# Patient Record
Sex: FEMALE | Race: WHITE | NOT HISPANIC OR LATINO | Employment: STUDENT | URBAN - METROPOLITAN AREA
[De-identification: names, ages, dates, MRNs, and addresses within clinical notes are randomized per-mention and may not be internally consistent; named-entity substitution may affect disease eponyms.]

---
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
## 2020-01-15 ENCOUNTER — OFFICE VISIT (OUTPATIENT)
Dept: INTERNAL MEDICINE | Facility: CLINIC | Age: 20
End: 2020-01-15
Payer: OTHER GOVERNMENT

## 2020-01-15 VITALS
HEIGHT: 68 IN | DIASTOLIC BLOOD PRESSURE: 64 MMHG | TEMPERATURE: 97 F | BODY MASS INDEX: 44.41 KG/M2 | WEIGHT: 293 LBS | OXYGEN SATURATION: 98 % | SYSTOLIC BLOOD PRESSURE: 112 MMHG | HEART RATE: 88 BPM

## 2020-01-15 DIAGNOSIS — F98.8 ATTENTION DEFICIT DISORDER (ADD) WITHOUT HYPERACTIVITY: Chronic | ICD-10-CM

## 2020-01-15 DIAGNOSIS — E55.9 VITAMIN D DEFICIENCY: ICD-10-CM

## 2020-01-15 DIAGNOSIS — E66.1 CLASS 3 DRUG-INDUCED OBESITY WITH BODY MASS INDEX (BMI) OF 45.0 TO 49.9 IN ADULT, UNSPECIFIED WHETHER SERIOUS COMORBIDITY PRESENT: ICD-10-CM

## 2020-01-15 DIAGNOSIS — J30.1 SEASONAL ALLERGIC RHINITIS DUE TO POLLEN: Chronic | ICD-10-CM

## 2020-01-15 DIAGNOSIS — Z78.9 USES BIRTH CONTROL: Primary | ICD-10-CM

## 2020-01-15 DIAGNOSIS — F32.A DEPRESSION, UNSPECIFIED DEPRESSION TYPE: ICD-10-CM

## 2020-01-15 DIAGNOSIS — J45.20 MILD INTERMITTENT ASTHMA WITHOUT COMPLICATION: Chronic | ICD-10-CM

## 2020-01-15 PROBLEM — R03.0 ELEVATED BLOOD PRESSURE READING: Status: RESOLVED | Noted: 2019-05-27 | Resolved: 2020-01-15

## 2020-01-15 PROCEDURE — 99999 PR PBB SHADOW E&M-EST. PATIENT-LVL IV: ICD-10-PCS | Mod: PBBFAC,,, | Performed by: PHYSICIAN ASSISTANT

## 2020-01-15 PROCEDURE — 99214 OFFICE O/P EST MOD 30 MIN: CPT | Mod: S$PBB,,, | Performed by: PHYSICIAN ASSISTANT

## 2020-01-15 PROCEDURE — 99999 PR PBB SHADOW E&M-EST. PATIENT-LVL IV: CPT | Mod: PBBFAC,,, | Performed by: PHYSICIAN ASSISTANT

## 2020-01-15 PROCEDURE — 99214 PR OFFICE/OUTPT VISIT, EST, LEVL IV, 30-39 MIN: ICD-10-PCS | Mod: S$PBB,,, | Performed by: PHYSICIAN ASSISTANT

## 2020-01-15 PROCEDURE — 99214 OFFICE O/P EST MOD 30 MIN: CPT | Mod: PBBFAC | Performed by: PHYSICIAN ASSISTANT

## 2020-01-15 RX ORDER — NORETHINDRONE ACETATE AND ETHINYL ESTRADIOL .02; 1 MG/1; MG/1
1 TABLET ORAL DAILY
Qty: 90 TABLET | Refills: 4 | Status: SHIPPED | OUTPATIENT
Start: 2020-01-15 | End: 2020-02-17 | Stop reason: SDUPTHER

## 2020-01-15 RX ORDER — LISDEXAMFETAMINE DIMESYLATE 30 MG/1
30 CAPSULE ORAL EVERY MORNING
Qty: 30 CAPSULE | Refills: 0 | Status: SHIPPED | OUTPATIENT
Start: 2020-01-15 | End: 2021-04-12 | Stop reason: SDUPTHER

## 2020-01-15 NOTE — PROGRESS NOTES
Subjective:      Patient ID: Hazel Villagran is a 19 y.o. female.    Chief Complaint: Medication Refill    HPI  Here today for a refill on her vyvanse 30mg. Last refilled in May. She has not been able to follow up due to being very busy. She has been out fo the past 2 months. She reports that her ability to talk with other and complete her tasks and job duties was much improved while on the vyvanse.   NO SE with vyvanse. No palpitations or anxiety with the vyvanse.   Previous PCP was Dr. Lima in [place] but since she recently moved to [place] she would like to re-establish with someone here at [facility].   She would also like a refill on her OCPs.    Patient Active Problem List   Diagnosis    Attention deficit disorder (ADD) without hyperactivity-stable on vyvanse.     Allergic rhinitis-stable on zyrtec and singulair    Mild intermittent asthma without complication-stable on singulair and rescue inhaler as needed    Vitamin D deficiency-has not been on OTC supplement    Class 3 severe obesity with body mass index (BMI) of 45.0 to 49.9 in adult    Depression-stable on trazodone.        Review of Systems   Constitutional: Negative for activity change, appetite change, chills, diaphoresis, fatigue, fever and unexpected weight change.   HENT: Negative.  Negative for congestion, hearing loss, postnasal drip, rhinorrhea, sore throat, trouble swallowing and voice change.    Eyes: Negative.  Negative for visual disturbance.   Respiratory: Negative.  Negative for cough, choking, chest tightness and shortness of breath.    Cardiovascular: Negative for chest pain, palpitations and leg swelling.   Gastrointestinal: Negative for abdominal distention, abdominal pain, blood in stool, constipation, diarrhea, nausea and vomiting.   Endocrine: Negative for cold intolerance, heat intolerance, polydipsia and polyuria.   Genitourinary: Negative.  Negative for difficulty urinating and frequency.   Musculoskeletal: Negative  "for arthralgias, back pain, gait problem, joint swelling and myalgias.   Skin: Negative for color change, pallor, rash and wound.   Neurological: Negative for dizziness, tremors, weakness, light-headedness, numbness and headaches.   Hematological: Negative for adenopathy.   Psychiatric/Behavioral: Positive for decreased concentration. Negative for agitation, behavioral problems, confusion, dysphoric mood, hallucinations, self-injury, sleep disturbance and suicidal ideas. The patient is not nervous/anxious and is not hyperactive.      Objective:   /64 (BP Location: Right arm, Patient Position: Sitting, BP Method: Large (Manual))   Pulse 88   Temp 97.3 °F (36.3 °C) (Tympanic)   Ht 5' 8" (1.727 m)   Wt (!) 156.2 kg (344 lb 5.7 oz)   SpO2 98%   BMI 52.36 kg/m²     Physical Exam   Constitutional: She is oriented to person, place, and time. She appears well-developed and well-nourished. No distress.   HENT:   Head: Normocephalic and atraumatic.   Right Ear: External ear normal.   Left Ear: External ear normal.   Nose: Nose normal.   Mouth/Throat: Oropharynx is clear and moist.   Eyes: Pupils are equal, round, and reactive to light. Conjunctivae and EOM are normal.   Neck: Normal range of motion. Neck supple.   Cardiovascular: Normal rate, regular rhythm and normal heart sounds. Exam reveals no gallop and no friction rub.   No murmur heard.  Pulmonary/Chest: Effort normal and breath sounds normal. No respiratory distress. She has no wheezes. She has no rales. She exhibits no tenderness.   Abdominal: Soft. She exhibits no distension. There is no tenderness.   Musculoskeletal: Normal range of motion.   Lymphadenopathy:     She has no cervical adenopathy.   Neurological: She is alert and oriented to person, place, and time.   Skin: Skin is warm and dry. She is not diaphoretic.   Psychiatric: She has a normal mood and affect. Her behavior is normal. Judgment and thought content normal.   Vitals " reviewed.      Assessment:     1. Uses birth control    2. Attention deficit disorder (ADD) without hyperactivity    3. Class 3 drug-induced obesity with body mass index (BMI) of 45.0 to 49.9 in adult, unspecified whether serious comorbidity present    4. Seasonal allergic rhinitis due to pollen    5. Depression, unspecified depression type    6. Mild intermittent asthma without complication    7. Vitamin D deficiency      Plan:   Uses birth control  -     norethindrone-ethinyl estradiol (MICROGESTIN 1/20) 1-20 mg-mcg per tablet; Take 1 tablet by mouth once daily.  Dispense: 90 tablet; Refill: 4    Attention deficit disorder (ADD) without hyperactivity  -     VYVANSE 30 mg capsule; Take 1 capsule (30 mg total) by mouth every morning.  Dispense: 30 capsule; Refill: 0  -last filled in may,  checked    Class 3 drug-induced obesity with body mass index (BMI) of 45.0 to 49.9 in adult, unspecified whether serious comorbidity present  Advise to maintain lifestyle changes with low carbohydrate, low sugar diet and exercise 30 minutes daily    Seasonal allergic rhinitis due to pollen  -stable on zyrtec and singulair    Depression, unspecified depression type  -continue trazodone.     Mild intermittent asthma without complication  -Stable and controlled. Continue current treatment plan as previously prescribed with your PCP.     Vitamin D deficiency  -start OTC vitamin D3 at least 800iu daily    Follow up in about 4 weeks (around 2/12/2020), or if symptoms worsen or fail to improve.

## 2020-02-17 ENCOUNTER — OFFICE VISIT (OUTPATIENT)
Dept: INTERNAL MEDICINE | Facility: CLINIC | Age: 20
End: 2020-02-17
Payer: OTHER GOVERNMENT

## 2020-02-17 ENCOUNTER — OFFICE VISIT (OUTPATIENT)
Dept: OBSTETRICS AND GYNECOLOGY | Facility: CLINIC | Age: 20
End: 2020-02-17
Payer: OTHER GOVERNMENT

## 2020-02-17 VITALS
WEIGHT: 293 LBS | HEIGHT: 68 IN | BODY MASS INDEX: 44.41 KG/M2 | DIASTOLIC BLOOD PRESSURE: 86 MMHG | SYSTOLIC BLOOD PRESSURE: 130 MMHG

## 2020-02-17 VITALS
BODY MASS INDEX: 44.41 KG/M2 | WEIGHT: 293 LBS | HEIGHT: 68 IN | DIASTOLIC BLOOD PRESSURE: 72 MMHG | TEMPERATURE: 98 F | HEART RATE: 80 BPM | OXYGEN SATURATION: 98 % | SYSTOLIC BLOOD PRESSURE: 112 MMHG

## 2020-02-17 DIAGNOSIS — G43.909 MIGRAINE WITHOUT STATUS MIGRAINOSUS, NOT INTRACTABLE, UNSPECIFIED MIGRAINE TYPE: ICD-10-CM

## 2020-02-17 DIAGNOSIS — F98.8 ATTENTION DEFICIT DISORDER (ADD) WITHOUT HYPERACTIVITY: Chronic | ICD-10-CM

## 2020-02-17 DIAGNOSIS — E66.01 SEVERE OBESITY (BMI >= 40): ICD-10-CM

## 2020-02-17 DIAGNOSIS — F32.A DEPRESSION, UNSPECIFIED DEPRESSION TYPE: ICD-10-CM

## 2020-02-17 DIAGNOSIS — Z78.9 USES BIRTH CONTROL: ICD-10-CM

## 2020-02-17 DIAGNOSIS — Z30.41 ENCOUNTER FOR SURVEILLANCE OF CONTRACEPTIVE PILLS: Primary | ICD-10-CM

## 2020-02-17 DIAGNOSIS — Z00.00 ROUTINE GENERAL MEDICAL EXAMINATION AT A HEALTH CARE FACILITY: Primary | ICD-10-CM

## 2020-02-17 DIAGNOSIS — Z00.00 ROUTINE GENERAL MEDICAL EXAMINATION AT A HEALTH CARE FACILITY: ICD-10-CM

## 2020-02-17 DIAGNOSIS — J45.20 MILD INTERMITTENT ASTHMA WITHOUT COMPLICATION: Chronic | ICD-10-CM

## 2020-02-17 PROCEDURE — 99213 OFFICE O/P EST LOW 20 MIN: CPT | Mod: PBBFAC | Performed by: NURSE PRACTITIONER

## 2020-02-17 PROCEDURE — 99999 PR PBB SHADOW E&M-EST. PATIENT-LVL III: CPT | Mod: PBBFAC,,, | Performed by: NURSE PRACTITIONER

## 2020-02-17 PROCEDURE — 99999 PR PBB SHADOW E&M-EST. PATIENT-LVL V: ICD-10-PCS | Mod: PBBFAC,,, | Performed by: INTERNAL MEDICINE

## 2020-02-17 PROCEDURE — 99215 OFFICE O/P EST HI 40 MIN: CPT | Mod: PBBFAC,27 | Performed by: INTERNAL MEDICINE

## 2020-02-17 PROCEDURE — 99213 OFFICE O/P EST LOW 20 MIN: CPT | Mod: S$PBB,,, | Performed by: NURSE PRACTITIONER

## 2020-02-17 PROCEDURE — 99999 PR PBB SHADOW E&M-EST. PATIENT-LVL V: CPT | Mod: PBBFAC,,, | Performed by: INTERNAL MEDICINE

## 2020-02-17 PROCEDURE — 99999 PR PBB SHADOW E&M-EST. PATIENT-LVL III: ICD-10-PCS | Mod: PBBFAC,,, | Performed by: NURSE PRACTITIONER

## 2020-02-17 PROCEDURE — 99213 PR OFFICE/OUTPT VISIT, EST, LEVL III, 20-29 MIN: ICD-10-PCS | Mod: S$PBB,,, | Performed by: NURSE PRACTITIONER

## 2020-02-17 PROCEDURE — 99395 PR PREVENTIVE VISIT,EST,18-39: ICD-10-PCS | Mod: S$PBB,,, | Performed by: INTERNAL MEDICINE

## 2020-02-17 PROCEDURE — 99395 PREV VISIT EST AGE 18-39: CPT | Mod: S$PBB,,, | Performed by: INTERNAL MEDICINE

## 2020-02-17 RX ORDER — NORETHINDRONE ACETATE AND ETHINYL ESTRADIOL .02; 1 MG/1; MG/1
1 TABLET ORAL DAILY
Qty: 90 TABLET | Refills: 4 | Status: SHIPPED | OUTPATIENT
Start: 2020-02-17 | End: 2021-04-12

## 2020-02-17 RX ORDER — BUPROPION HYDROCHLORIDE 150 MG/1
150 TABLET ORAL DAILY
Qty: 90 TABLET | Refills: 3 | Status: SHIPPED | OUTPATIENT
Start: 2020-02-17 | End: 2021-04-12

## 2020-02-17 NOTE — PROGRESS NOTES
Subjective:      Patient ID: Hazel Villagran is a 19 y.o. female.    Chief Complaint: Establish Care    Depression   Visit Type: initial  Onset of symptoms: more than 1 year ago  Progression since onset: gradually worsening  Patient presents with the following symptoms: anhedonia, depressed mood, feelings of hopelessness, feelings of worthlessness and weight gain.  Patient is not experiencing: chest pain, compulsions, confusion, irritability, memory impairment, nervousness/anxiety, palpitations, suicidal ideas, suicidal planning and weight loss.  Frequency of symptoms: most days   Severity: moderate   Exacerbated by: living , financial situations.  Sleep quality: fair (trazodone helps)  Risk factors: major life event  Patient has a history of: depression  No history of: CAD  Treatments tried: trazodone.  Compliance with treatment: good  Improvement on treatment: mild  Compliance problems: none.       Review of Systems   Constitutional: Positive for weight gain. Negative for activity change, chills, diaphoresis, fever, irritability, unexpected weight change and weight loss.   HENT: Negative for ear pain, hearing loss, rhinorrhea, sore throat and trouble swallowing.    Eyes: Negative for discharge and visual disturbance.   Respiratory: Negative for cough, chest tightness and wheezing.    Cardiovascular: Negative for chest pain and palpitations.   Gastrointestinal: Negative for abdominal pain, blood in stool, constipation, diarrhea and vomiting.   Endocrine: Negative for polydipsia and polyuria.   Genitourinary: Negative for difficulty urinating, dysuria, hematuria and menstrual problem.   Musculoskeletal: Positive for arthralgias and neck pain. Negative for joint swelling.   Neurological: Positive for headaches. Negative for seizures, syncope and weakness.   Psychiatric/Behavioral: Positive for depression and dysphoric mood. Negative for confusion, hallucinations and suicidal ideas. The patient is not nervous/anxious.       18 yo with   Patient Active Problem List   Diagnosis    Attention deficit disorder (ADD) without hyperactivity    Allergic rhinitis    Mild intermittent asthma without complication    Vitamin D deficiency    Class 3 severe obesity with body mass index (BMI) of 45.0 to 49.9 in adult    Depression    Migraine     Past Medical History:   Diagnosis Date    Asthma     Attention deficit disorder (ADD) without hyperactivity 1/2/2018    Class 3 severe obesity with body mass index (BMI) of 45.0 to 49.9 in adult     Depression 2018    Her psychiatrist feels it started earlier than 2018; also w/ FHx of depression; no s/h/i currently    Insulin resistance 1/2/2018    Migraine headache     Dx'd by Dr. Nogueira in neurology    Mild intermittent asthma without complication 1/2/2018    Multiple allergies     Vitamin D deficiency 01/22/2019     Here today for annual prev exam.  Compliant with meds without significant side effects. Energy and appetite are good.     Pt also c/o depression. New problem to me.     family history includes Cancer in her maternal grandfather and maternal grandmother; Diabetes in her father and paternal grandfather; Migraines in her mother.    Social History     Socioeconomic History    Marital status: Single     Spouse name: Not on file    Number of children: Not on file    Years of education: Not on file    Highest education level: Not on file   Occupational History    Not on file   Social Needs    Financial resource strain: Somewhat hard    Food insecurity:     Worry: Sometimes true     Inability: Sometimes true    Transportation needs:     Medical: No     Non-medical: No   Tobacco Use    Smoking status: Never Smoker    Smokeless tobacco: Never Used   Substance and Sexual Activity    Alcohol use: No     Frequency: Monthly or less     Drinks per session: 1 or 2     Binge frequency: Less than monthly    Drug use: No    Sexual activity: Not Currently     Birth  control/protection: Pill   Lifestyle    Physical activity:     Days per week: 2 days     Minutes per session: 30 min    Stress: To some extent   Relationships    Social connections:     Talks on phone: More than three times a week     Gets together: Once a week     Attends Worship service: Not on file     Active member of club or organization: Yes     Attends meetings of clubs or organizations: More than 4 times per year     Relationship status: Never    Other Topics Concern    Not on file   Social History Narrative    Patient goal(s):    Motivation for goals (0-10):    Breakfast (quantity, including drinks):    Lunch:     Dinner:     Snacks:    Eating out:     Water (oz/day): 60 oz/d (previously 30 oz/d)    Physical Activity (min/d & d/wk):      Past Surgical History:   Procedure Laterality Date    TONSILLECTOMY, ADENOIDECTOMY         Review of Systems   Constitutional: Positive for weight gain. Negative for activity change, chills, diaphoresis, fever, irritability, unexpected weight change and weight loss.   HENT: Negative for ear pain, hearing loss, rhinorrhea, sore throat and trouble swallowing.    Eyes: Negative for discharge and visual disturbance.   Respiratory: Negative for cough, chest tightness and wheezing.    Cardiovascular: Negative for chest pain and palpitations.   Gastrointestinal: Negative for abdominal pain, blood in stool, constipation, diarrhea and vomiting.   Endocrine: Negative for polydipsia and polyuria.   Genitourinary: Negative for difficulty urinating, dysuria, hematuria and menstrual problem.   Musculoskeletal: Positive for arthralgias and neck pain. Negative for joint swelling.   Neurological: Positive for headaches. Negative for seizures, syncope and weakness.   Psychiatric/Behavioral: Positive for depression and dysphoric mood. Negative for confusion, hallucinations and suicidal ideas. The patient is not nervous/anxious.      Objective:   /72 (BP Location: Left arm)  "  Pulse 80   Temp 98.3 °F (36.8 °C) (Tympanic)   Ht 5' 8" (1.727 m)   Wt (!) 156.1 kg (344 lb 2.2 oz)   LMP 02/07/2020 (Approximate)   SpO2 98%   BMI 52.33 kg/m²     Physical Exam   Constitutional: She is oriented to person, place, and time. She appears well-developed and well-nourished. No distress.   HENT:   Head: Normocephalic and atraumatic.   Mouth/Throat: Oropharynx is clear and moist.   Eyes: Pupils are equal, round, and reactive to light. EOM are normal.   Neck: Neck supple. No thyromegaly present.   Cardiovascular: Normal rate and regular rhythm.   Pulmonary/Chest: Breath sounds normal. She has no wheezes. She has no rales.   Abdominal: Soft. Bowel sounds are normal. There is no tenderness.   Musculoskeletal: She exhibits no edema.   Lymphadenopathy:     She has no cervical adenopathy.   Neurological: She is alert and oriented to person, place, and time.   Skin: Skin is warm and dry.   Psychiatric: She has a normal mood and affect. Her behavior is normal.       Assessment:     1. Routine general medical examination at a health care facility    2. Attention deficit disorder (ADD) without hyperactivity    3. Depression, unspecified depression type    4. Mild intermittent asthma without complication    5. Migraine without status migrainosus, not intractable, unspecified migraine type    6. Severe obesity (BMI >= 40)      Plan:   Routine general medical examination at a health care facility  Heart healthy diet and regular exercise.  Health maintenance reviewed patient  Advised flu vaccine  -     Comprehensive metabolic panel; Future; Expected date: 02/17/2020  -     CBC auto differential; Future; Expected date: 02/17/2020  -     TSH; Future; Expected date: 02/17/2020  -     Lipid panel; Future; Expected date: 02/17/2020  -     Vitamin D; Future; Expected date: 02/17/2020  -     Hemoglobin A1c; Future; Expected date: 02/17/2020  -     HIV 1/2 Ag/Ab (4th Gen); Future; Expected date: 02/17/2020  -     " Ambulatory referral/consult to Gynecology; Future; Expected date: 02/24/2020    Attention deficit disorder (ADD) without hyperactivity  Reports does not need Vyvanse at this time.  Takes on her work days.      Depression, unspecified depression type  -     Ambulatory referral/consult to Psychiatry; Future; Expected date: 02/24/2020  -     buPROPion (WELLBUTRIN XL) 150 MG TB24 tablet; Take 1 tablet (150 mg total) by mouth once daily.  Dispense: 90 tablet; Refill: 3    Mild intermittent asthma without complication  No recent flare    Migraine without status migrainosus, not intractable, unspecified migraine type  No recent headaches    Severe obesity (BMI >= 40)  Diet and exercise as discussed  -     Ambulatory referral/consult to Nutrition Services; Future; Expected date: 02/24/2020        Lab Frequency Next Occurrence   Ambulatory referral/consult to Psychiatry Once 02/24/2020   Comprehensive metabolic panel Once 02/17/2020   CBC auto differential Once 02/17/2020   TSH Once 02/17/2020   Lipid panel Once 02/17/2020   Vitamin D Once 02/17/2020   Hemoglobin A1c Once 02/17/2020   HIV 1/2 Ag/Ab (4th Gen) Once 02/17/2020   Ambulatory referral/consult to Gynecology Once 02/24/2020   Ambulatory referral/consult to Nutrition Services Once 02/24/2020       Problem List Items Addressed This Visit        Neuro    Migraine       Psychiatric    Depression    Overview     Her psychiatrist feels it started earlier than 2018; also w/ FHx of depression; no s/h/i currently         Relevant Medications    buPROPion (WELLBUTRIN XL) 150 MG TB24 tablet    Other Relevant Orders    Ambulatory referral/consult to Psychiatry    Attention deficit disorder (ADD) without hyperactivity (Chronic)       Pulmonary    Mild intermittent asthma without complication (Chronic)      Other Visit Diagnoses     Routine general medical examination at a health care facility    -  Primary    Relevant Orders    Comprehensive metabolic panel    CBC auto  differential    TSH    Lipid panel    Vitamin D    Hemoglobin A1c    HIV 1/2 Ag/Ab (4th Gen)    Ambulatory referral/consult to Gynecology    Severe obesity (BMI >= 40)        Relevant Orders    Ambulatory referral/consult to Nutrition Services          Follow up in about 4 weeks (around 3/16/2020), or if symptoms worsen or fail to improve.

## 2020-02-17 NOTE — PROGRESS NOTES
Subjective:      Hazel Villagran is a 19 y.o. female who presents for contraception counseling. The patient has no complaints today. The patient is not currently sexually active. Pertinent past medical history: none.    Menstrual History:  OB History        0    Para   0    Term   0       0    AB   0    Living   0       SAB   0    TAB   0    Ectopic   0    Multiple   0    Live Births   0                  Patient's last menstrual period was 2020 (approximate).       The following portions of the patient's history were reviewed and updated as appropriate: allergies, current medications, past family history, past medical history, past social history, past surgical history and problem list.    Review of Systems  Pertinent items are noted in HPI.     Objective:       deferred      Assessment:      19 y.o., continuing OCP (estrogen/progesterone), no contraindications.     Plan:      All questions answered.

## 2020-02-17 NOTE — PATIENT INSTRUCTIONS
Birth Control: The Pill    Birth control pills contain hormones that help prevent pregnancy. The pills are prescribed by your healthcare provider. There are many types of birth control pills available. If you have side effects from one type of pill, tell your healthcare provider. He or she may be able to prescribe a pill that works better for you.  Pregnancy rates  Talk to your healthcare provider about the effectiveness of this birth control method.  Using the pill  · Take one pill daily. Take it at around the same time each day.  · Follow your healthcare providers guidelines on when to start your first pack of pills. You may need to use another form of birth control for a week or more after you start.  · Know what to do if you forget to take a pill. (Consult your healthcare provider or check the package.) If you miss more than one pill, you may need to use a backup method of birth control for a week or more.  Pros  · Low pregnancy rate  · No interruption to sex  · Easy to use  · Can help make periods more regular  · May lower your risk of ovarian cysts and certain cancers  · May decrease menstrual cramps, menstrual flow, and acne  Cons  · Does not protect against sexually transmitted infection (STIs)  · Requires taking a pill on time each day  · May not work as well when taken with certain other medicines (check with your pharmacist)  · May cause side effects such as nausea, irregular bleeding, headaches, breast tenderness, fatigue, or mood changes (these often go away within 3 months)  · May increase the risk of blood clots, heart attack, and stroke  The pill may not be for you  The pill may not be for you if:  · You are a smoker and over age 35  · You have high blood pressure or gallbladder, liver, cerebrovascular  or heart disease  · You have diabetes, migraines, blood clot in the vein or artery, lupus, depression, certain lipid disorders, or take medicines that interfere with the pill  In these cases,  discuss the risks with your healthcare provider.  Date Last Reviewed: 3/1/2017  © 8566-4840 [employer]. [address] [place] [address]. All rights reserved. This information is not intended as a substitute for professional medical care. Always follow your healthcare professional's instructions.        How Birth Control Works  Birth control prevents pregnancy by preventing conception. Some methods prevent an egg from maturing. Some keep the sperm and egg from meeting. And some methods work in both ways.  Preventing ovulation  Certain hormones help prevent an egg from maturing and being released. Hormone methods include:  · Birth control pills  · Skin patches  · Contraceptive vaginal rings  · Injections  Preventing sperm and egg from meeting  Methods that prevent the sperm and egg from joining include:  · Barrier methods, such as the condom, the diaphragm, and the cervical cap  · Spermicide  · The IUD (intrauterine device)  · Sterilization  · Natural family planning  · Some types of hormone methods  Date Last Reviewed: 3/1/2017  © 8460-1884 [employer]. [address] [place] [address]. All rights reserved. This information is not intended as a substitute for professional medical care. Always follow your healthcare professional's instructions.

## 2020-03-05 ENCOUNTER — PATIENT OUTREACH (OUTPATIENT)
Dept: ADMINISTRATIVE | Facility: OTHER | Age: 20
End: 2020-03-05

## 2020-03-20 ENCOUNTER — TELEPHONE (OUTPATIENT)
Dept: ADMINISTRATIVE | Facility: HOSPITAL | Age: 20
End: 2020-03-20

## 2020-03-20 NOTE — TELEPHONE ENCOUNTER
Contacted patient to reschedule appointment with Dr Quiñones on 03/23/2020. Left voicemail for patient to call to reschedule appointment. PDaugherty

## 2020-03-23 ENCOUNTER — PATIENT MESSAGE (OUTPATIENT)
Dept: NUTRITION | Facility: CLINIC | Age: 20
End: 2020-03-23

## 2020-03-23 ENCOUNTER — TELEPHONE (OUTPATIENT)
Dept: NUTRITION | Facility: CLINIC | Age: 20
End: 2020-03-23

## 2020-03-29 ENCOUNTER — PATIENT OUTREACH (OUTPATIENT)
Dept: ADMINISTRATIVE | Facility: OTHER | Age: 20
End: 2020-03-29

## 2020-04-30 ENCOUNTER — PATIENT OUTREACH (OUTPATIENT)
Dept: ADMINISTRATIVE | Facility: OTHER | Age: 20
End: 2020-04-30

## 2020-05-01 ENCOUNTER — TELEPHONE (OUTPATIENT)
Dept: NUTRITION | Facility: CLINIC | Age: 20
End: 2020-05-01

## 2020-05-01 NOTE — TELEPHONE ENCOUNTER
Called patient to remind her of appt at 1PM. Pt was driving. Rescheduled visit for 5/4/2020 at 1 PM.

## 2020-05-01 NOTE — PROGRESS NOTES
" The patient location is: [place]  The chief complaint leading to consultation is: Weight management   Visit type: audiovisual  Total time spent with patient: 68 minutes  Each patient to whom he or she provides medical services by telemedicine is:  (1) informed of the relationship between the physician and patient and the respective role of any other health care provider with respect to management of the patient; and (2) notified that he or she may decline to receive medical services by telemedicine and may withdraw from such care at any time.    Notes:   Nutrition Assessment  Client name:  Hazel Villagran  :  2000  Age:  19 y.o.  Gender:  female    Client states:  Previously Struggled With Eating Disorder Binge/Purge Pattern with days of not eating, she never received treatment for her ED. Reports it is under control and she is in a better mental place. Pt used be on metformin but it made her sick. She is aware the carbs turn into sugar. On vivance which help regulates ADD and binge eating. Pt reports that she does not have a scale and prefers not to weigh daily. Dairy causes pt to break out and she has occasional lactose intolerance. Pt believes that eating red meat causes a lot of inflammation. Pt reports she often forgets to eat/ medicine causes her not to be hungry. Reports inability to afford healthy food. Pt enjoys cooking and sticks to same types of food that she meal preps. Overall diet is healthy but pt reports inability to lose weight. Pt plans to sign up for food stamps next week and will receive financial assistance when she is living at home with mom in [place] for summer. Shops primarily at [employer] and has approximately $100-150 budgeted for food when she is working.    Anthropometrics  Height:  5' 8"    Weight:  343 lbs  BMI:  52.3 kg/m2      Clinical Signs/Symptoms  N/V/D:  Occasionally experiences nausea/vomiting  Appetite (Good, Fair, or Poor): Fair     Past Medical History: "   Diagnosis Date    Asthma     Attention deficit disorder (ADD) without hyperactivity 1/2/2018    Class 3 severe obesity with body mass index (BMI) of 45.0 to 49.9 in adult     Depression 2018    Her psychiatrist feels it started earlier than 2018; also w/ FHx of depression; no s/h/i currently    Insulin resistance 1/2/2018    Migraine headache     Dx'd by Dr. Nogueira in neurology    Mild intermittent asthma without complication 1/2/2018    Multiple allergies     Vitamin D deficiency 01/22/2019       Past Surgical History:   Procedure Laterality Date    TONSILLECTOMY, ADENOIDECTOMY         Medications    has a current medication list which includes the following prescription(s): albuterol, bupropion, cetirizine, epinephrine, metronidazole 1%, montelukast, norethindrone-ethinyl estradiol, trazodone, vyvanse, and zonisamide.    Vitamins, Minerals, and/or Supplements:  Vitamin D, B12 when she could afford to, and Digestion Plus from [facility]    Food/Medication Interactions:  Reviewed     Food Allergies or Intolerances: Trying to be dairy free, limiting red meat intake, Allergic to Kiwi and honey Dew melons and Cantalope.    Social History    Marital status:  Single  Employment:  Was working at [employer] during quarantine and got a good amount of hours, plans to move to [place] on May 15th for the summer. Was in school for cosmetology but graduated. Plans to move back to [place] once roomates come back for fall semester.     Social History     Tobacco Use    Smoking status: Never Smoker    Smokeless tobacco: Never Used   Substance Use Topics    Alcohol use: No     Frequency: Monthly or less     Drinks per session: 1 or 2     Binge frequency: Less than monthly        Lab Reports   No recent labs    Food History  Yesterday/Today:    Lunch:  Popcorn[employer] Bagged Popcorn  Dinner:  [place] w/ Sliced Chicken Breast, Lettuce, Cheese, Mayonnaise on wheat bread  H.S. Snack:  Sometimes she'll have a  snack around dinner time    *Fluid intake:  Tries to drink 1-2 Liters of water per day, occasional sweet tea and lemonade/carolyn flavoring      Reports that she doesn't eat 3 formal meals a day but she'll have 2 meals and 2 snacks on a normal day when she remembers to eat. Eats regularly when she has a consistent schedule. Normal breakfast is Eggs and Avocado.     Exercise History:  Pt used to exercise but states that she no longer works out regularly since she start [employer] school. Reports she hasn't made time to work out in a while but started a work out plan with friend yesterday. [facility]    Cultural/Spiritual/Personal Preferences:  None    Support System:  None, pt lives on own/ with college student who eat pretty unhealthy. She reports cooking for herself and roomates which holds her accountable.     State of Change:  Contemplation     Barriers to Change:  Financial Difficulty to purchase foods    Diagnosis    Obesity related to lifelong disordered eating patterns as evidenced by BMI of 52.3.  Inadequate fluid intake related to inability to lose weight as evidenced by 24 hour recall reports intake less than needs.  Limited access to food related to financial constraints as evidenced by pt reporting she is unable to buy healthy foods.  Food and Nutrition related knowledge deficit related to healthy eating patterns as evidenced by no prior nutrition education.     Intervention    Calorie Needs (via [place] [address] Jeor):  Activity Factor:  1.2   - Maintenance: 0055-1464 kcal   -Loss:2200 kcal for loss of 1-2 lbs per week  Protein (via 0.8 g/kg): 56-66g   Fluid (1kcal/ml):  2700 or 90 oz      Goals:  1. Set an alarm to remind pt of meal times  2.  Exercise regularly.  3.  Sign up for EBT card.     Nutrition Education  Pt and I discussed in detail the history of her eating disorder and her weight. Educated her about the dangers of restrive eating patterns such as veganism in people who have battled  ED in the past. Discussed weight loss strategies including setting an alarm to remind patient to eat, drink plenty of fluids before meal times, eating small, frequent meals, cooking at home. Also discussed ways to cut costs when trying to eat healthy including buying frozen vegetables and using [employer] for food subscription (accepts EBT). Discussed the importance of various work out routines. Explained the MyPlate method and the importance of each macronutrient (Carb, Fat, Protein). Pt will be in [place] for the summer so follow up cannot be scheduled until she returns. Pt said she will reach out to schedule. Also educated on common diet misconceptions commonly seen on social media.     Patient verbalized understanding of nutrition education and recommendations received.    Handouts Provided  Weight Loss Tips NCM    Monitoring/Evaluation    Monitor the following:  Weight  BMI  Caloric intake    Follow Up Plan:  PRN, when patient returns from being in [place] for the summer

## 2020-05-04 ENCOUNTER — CLINICAL SUPPORT (OUTPATIENT)
Dept: NUTRITION | Facility: CLINIC | Age: 20
End: 2020-05-04
Payer: OTHER GOVERNMENT

## 2020-05-04 DIAGNOSIS — Z71.3 DIETARY COUNSELING: Primary | ICD-10-CM

## 2020-05-04 DIAGNOSIS — E66.01 SEVERE OBESITY (BMI >= 40): ICD-10-CM

## 2020-05-04 PROCEDURE — 97802 PR MED NUTR THER, 1ST, INDIV, EA 15 MIN: ICD-10-PCS | Mod: 95,,, | Performed by: DIETITIAN, REGISTERED

## 2020-05-04 PROCEDURE — 97802 MEDICAL NUTRITION INDIV IN: CPT | Mod: 95,,, | Performed by: DIETITIAN, REGISTERED

## 2020-05-04 NOTE — PATIENT INSTRUCTIONS
Food Box service that accepts EBT: [employer] [place]    Weight Loss Tips  General Tips  Eat at least three times per day.  Pay attention to your body. When you feel like you have had enough to eat, stop. Quit before you feel full, stuffed, or  sick from eating. You can have more if you are really hungry.  If you still feel hungry or unsatisfied after a meal or snack, wait at least 10 minutes before you have more food. Often,  the craving will go away.  Drink plenty of calorie-free drinks (water, tea, coffee, diet soda). You may be thirsty, not hungry.  Pick lean meats, low-fat or nonfat cheese, and fat-free (skim) or low-fat (1%) milk instead of higher-fat/higher-calorie  choices.  Get plenty of fiber. Vegetables, fruits, and whole grains are good sources. Have a high-fiber cereal every day.  Cut back on sugar. For example, drink less fruit juice and regular soda.  Limit the amount of alcohol (beer, wine, and liquor) that you drink.  Keep all food in the kitchen. Eat only in a chosen place, such as at the table. Dont eat in the car or the bedroom or in  front of the TV.  Food Preparation  Plan meals ahead of time.  Try cooking methods that cut calories:  Cook without adding fat (bake, broil, roast, boil).  Use nonstick cooking sprays instead of butter or oil. You can also use wine, broth, or fruit juice instead of oil  when cooking.  Use low-calorie foods instead of high-calorie ones when possible.  Cook only what you need for one meal (dont make leftovers).  If you do make extra portions, put them away as soon as they are ready so you can save them for other meals. Store  the leftovers in containers that you cant see through.  Cook when you are not hungry. For example, cook and refrigerate tomorrows dinner after you have finished eating  tonight.  Make fruits, vegetables, and other low-calorie foods part of each meal.  Drink water while you cook.  Mealtimes  Drink a glass of water before you eat. Drink more  during meals.  Use smaller plates, bowls, glasses, and serving spoons.  Divide your plate into four equal parts. Use one part for meat, one for starch (such as pasta, rice, potatoes, or bread),  and two for nonstarchy vegetables.  Do not put serving dishes on the table. This will make it harder to take a second portion.  Put salad dressing on the side instead of mixing it with or pouring onto your salad. Then dip your fork into the  dressing before you spear a bite of salad.  Change your usual place at the table.  Make mealtime special by using pretty dishes, napkins, and glasses.  9/3/2019  Copyright Academy of Nutrition and Dietetics. This handout may be duplicated for client education. Page 2/2  Eat slowly. Take a few 1-minute breaks from eating during meals. Put your fork down between bites. Cut your food one  bite at a time.  Enjoy fruit for dessert instead of cake, pie, or other sweets.  Leave a little food on your plate. (You control the food; it doesnt control you.)  Remove your plate as soon as youve finished eating.  Snacking  Snacking can be part of your plan for healthy weight loss. You can eat six times per day as long as you plan what to eat and  dont eat too many calories.  Plan ahead. Be sure to have healthy snacks on hand. If the right food is not there, you may be more likely to eat  whatever is available, such as candy, cookies, chips, leftovers, or other quick choices.  Keep low-calorie snacks in a special part of the refrigerator. Good choices include the following:  Reduced-fat string cheese, low-calorie yogurt, and fat-free milk.  Washed, bite-size pieces of raw vegetables, such as carrots, celery, pepper strips, cucumbers, broccoli, and  cauliflower. Serve with low-calorie dips.  Fresh fruit.  Eating and Emotions  Do you use eating to deal with feelings other than hunger, such as boredom, being tired, or stress? If you eat for these  reasons, here are some other things you can try:  Call  a friend for support.  Use inspirational quotes to help you avoid the temptation to eat.  Take a warm bath or shower.  Listen to music or a relaxation CD.  Take a walk.  Try activities that keep you from eating. For example, its hard to eat while youre exercising. If you are gardening, you  probably wont eat while your hands are covered in soil.

## 2020-11-17 ENCOUNTER — OFFICE VISIT (OUTPATIENT)
Dept: URGENT CARE | Facility: CLINIC | Age: 20
End: 2020-11-17
Payer: OTHER GOVERNMENT

## 2020-11-17 VITALS
HEART RATE: 97 BPM | TEMPERATURE: 98 F | BODY MASS INDEX: 53.87 KG/M2 | SYSTOLIC BLOOD PRESSURE: 170 MMHG | OXYGEN SATURATION: 100 % | DIASTOLIC BLOOD PRESSURE: 88 MMHG | HEIGHT: 67 IN

## 2020-11-17 DIAGNOSIS — J06.9 UPPER RESPIRATORY TRACT INFECTION, UNSPECIFIED TYPE: Primary | ICD-10-CM

## 2020-11-17 DIAGNOSIS — Z03.818 ENCOUNTER FOR OBSERVATION FOR SUSPECTED EXPOSURE TO OTHER BIOLOGICAL AGENTS RULED OUT: ICD-10-CM

## 2020-11-17 LAB
CTP QC/QA: YES
CTP QC/QA: YES
FLUAV AG NPH QL: NEGATIVE
FLUBV AG NPH QL: NEGATIVE
SARS-COV-2 RDRP RESP QL NAA+PROBE: NEGATIVE

## 2020-11-17 PROCEDURE — U0002: ICD-10-PCS | Mod: QW,S$GLB,, | Performed by: EMERGENCY MEDICINE

## 2020-11-17 PROCEDURE — 87804 POCT INFLUENZA A/B: ICD-10-PCS | Mod: QW,S$GLB,, | Performed by: EMERGENCY MEDICINE

## 2020-11-17 PROCEDURE — U0002 COVID-19 LAB TEST NON-CDC: HCPCS | Mod: QW,S$GLB,, | Performed by: EMERGENCY MEDICINE

## 2020-11-17 PROCEDURE — 99213 PR OFFICE/OUTPT VISIT, EST, LEVL III, 20-29 MIN: ICD-10-PCS | Mod: 25,S$GLB,, | Performed by: EMERGENCY MEDICINE

## 2020-11-17 PROCEDURE — 99213 OFFICE O/P EST LOW 20 MIN: CPT | Mod: 25,S$GLB,, | Performed by: EMERGENCY MEDICINE

## 2020-11-17 PROCEDURE — 87804 INFLUENZA ASSAY W/OPTIC: CPT | Mod: QW,S$GLB,, | Performed by: EMERGENCY MEDICINE

## 2020-11-17 RX ORDER — FLUTICASONE PROPIONATE 50 MCG
2 SPRAY, SUSPENSION (ML) NASAL DAILY
Qty: 16 G | Refills: 0 | Status: SHIPPED | OUTPATIENT
Start: 2020-11-17

## 2020-11-17 NOTE — LETTER
November 17, 2020      [facility]  [address]  [place] [address]  Phone: [phone]       Patient: Hazel Villagran   YOB: 2000  Date of Visit: 11/17/2020    To Whom It May Concern:    Richard Villagran  was at [facility] on 11/17/2020. She may return to work/school on 11/22/2020 with no restrictions. If you have any questions or concerns, or if I can be of further assistance, please do not hesitate to contact me.    Sincerely,        Rachael Caballero MD

## 2020-11-18 NOTE — PROGRESS NOTES
"Subjective:       Patient ID: Hazel Villagran is a 19 y.o. female.    Vitals:  height is 5' 7" (1.702 m). Her temporal temperature is 97.5 °F (36.4 °C). Her blood pressure is 170/88 (abnormal) and her pulse is 97. Her oxygen saturation is 100%.     Chief Complaint: Sinus Problem (x 2-3 days ), Nasal Congestion, Cough, Nausea, and Generalized Body Aches    19-year-old female presents to urgent care for COVID concerns.  No specific known contact.  However for the last 3 days she has had some URI symptoms including nasal congestion, body aches, fatigue.  States that she gets viral illness every fall and winter.  Has asthma but states that this is not causing any exacerbation of her asthma.  No GI symptoms reported      Constitution: Positive for fatigue. Negative for fever.   HENT: Positive for congestion.    Neck: negative.   Cardiovascular: Negative.    Eyes: Negative.    Respiratory: Positive for cough.    Gastrointestinal: Positive for nausea.   Genitourinary: Negative.    Musculoskeletal: Positive for muscle ache.   Allergic/Immunologic: Negative.    Neurological: Negative.    Hematologic/Lymphatic: Negative.    Psychiatric/Behavioral: Negative.        Objective:      Physical Exam   Constitutional: She is oriented to person, place, and time. She appears well-developed. She is cooperative.  Non-toxic appearance. She does not appear ill. No distress. obesity  HENT:   Head: Normocephalic and atraumatic.   Ears:   Right Ear: Hearing, tympanic membrane, external ear and ear canal normal.   Left Ear: Hearing, tympanic membrane, external ear and ear canal normal.      Comments: Mild clear middle ear effusions bilaterally  Nose: Congestion present. No mucosal edema, rhinorrhea or nasal deformity. No epistaxis. Right sinus exhibits no maxillary sinus tenderness and no frontal sinus tenderness. Left sinus exhibits no maxillary sinus tenderness and no frontal sinus tenderness.      Comments: Postnasal drip and " cobblestoning.  Mouth/Throat: Uvula is midline, oropharynx is clear and moist and mucous membranes are normal. Mucous membranes are moist. No trismus in the jaw. Normal dentition. No uvula swelling. No oropharyngeal exudate, posterior oropharyngeal edema or posterior oropharyngeal erythema.   Eyes: Conjunctivae and lids are normal. No scleral icterus. extraocular movement intact  Neck: Trachea normal, full passive range of motion without pain and phonation normal. Neck supple. No neck rigidity. No edema and no erythema present.   Cardiovascular: Normal rate, regular rhythm, normal heart sounds and normal pulses.   Pulmonary/Chest: Effort normal and breath sounds normal. No respiratory distress. She has no decreased breath sounds. She has no rhonchi.   Abdominal: Normal appearance.   Musculoskeletal: Normal range of motion.         General: No deformity.   Neurological: She is alert and oriented to person, place, and time. She exhibits normal muscle tone. Coordination normal.   Skin: Skin is warm, dry, intact, not diaphoretic and not pale. Psychiatric: Her speech is normal and behavior is normal. Judgment and thought content normal.   Nursing note and vitals reviewed.        Assessment:       1. Upper respiratory tract infection, unspecified type    2. Encounter for observation for suspected exposure to other biological agents ruled out        Results for orders placed or performed in visit on 11/17/20   POCT COVID-19 Rapid Screening   Result Value Ref Range    POC Rapid COVID Negative Negative     Acceptable Yes    POCT Influenza A/B   Result Value Ref Range    Rapid Influenza A Ag Negative Negative    Rapid Influenza B Ag Negative Negative     Acceptable Yes        Plan:         Upper respiratory tract infection, unspecified type  -     POCT Influenza A/B  -     fluticasone propionate (FLONASE) 50 mcg/actuation nasal spray; 2 sprays (100 mcg total) by Each Nostril route once daily.   Dispense: 16 g; Refill: 0    Encounter for observation for suspected exposure to other biological agents ruled out  -     POCT COVID-19 Rapid Screening         Use over the counter(OTC) antihistamine like claritin or zyrtec daily.  Flonase: 2 sprays per nostril 1-2 times a day.   Nasal saline drops: 2-4 drops per nostril 10-15 times a day.   Tylenol or Motrin for fever or pain per label instructions.  Consider use of OTC cough medicine like Robitussin DM.  Avoid OTC decongestants if you have high blood pressure. This includes multi-cold symptom preparations.    Follow up in 2-3 days with PCP if no improvement or any worsening.       You have tested negative for COVID-19 today. If you did not have any close exposure as defined below, then effective today, you can return to your normal daily activities including social distancing, wearing masks, and frequent handwashing.  A close exposure is defined as anyone who had a masked or an unmasked exposure to a known COVID -19 positive person, at less than 6 ft for more than 15 minutes. If your exposure meets this definition, then you are required to quarantine for 14 days per the CDC.  The 14 day quarantine begins from the day you were exposed, not the day of your test. For example, if your exposure was on a Monday, and you waited until Friday of the same week to get tested and it was negative, your 14 day quarantine begins from that Monday, not the Friday you tested negative.  If you developed symptoms since the exposure, and your test was negative today, you still have to quarantine for 14 days from the date of the exposure.  So if you meet the definition of a close exposure, A NEGATIVE TEST DOES NOT GET YOU OUT OF 14 DAYS OF QUARANTINE!

## 2020-11-18 NOTE — PATIENT INSTRUCTIONS
Use over the counter(OTC) antihistamine like claritin or zyrtec daily.  Flonase: 2 sprays per nostril 1-2 times a day.   Nasal saline drops: 2-4 drops per nostril 10-15 times a day.   Tylenol or Motrin for fever or pain per label instructions.  Consider use of OTC cough medicine like Robitussin DM.  Avoid OTC decongestants if you have high blood pressure. This includes multi-cold symptom preparations.    Follow up in 2-3 days with PCP if no improvement or any worsening.       You have tested negative for COVID-19 today. If you did not have any close exposure as defined below, then effective today, you can return to your normal daily activities including social distancing, wearing masks, and frequent handwashing.  A close exposure is defined as anyone who had a masked or an unmasked exposure to a known COVID -19 positive person, at less than 6 ft for more than 15 minutes. If your exposure meets this definition, then you are required to quarantine for 14 days per the CDC.  The 14 day quarantine begins from the day you were exposed, not the day of your test. For example, if your exposure was on a Monday, and you waited until Friday of the same week to get tested and it was negative, your 14 day quarantine begins from that Monday, not the Friday you tested negative.  If you developed symptoms since the exposure, and your test was negative today, you still have to quarantine for 14 days from the date of the exposure.  So if you meet the definition of a close exposure, A NEGATIVE TEST DOES NOT GET YOU OUT OF 14 DAYS OF QUARANTINE!

## 2021-03-25 ENCOUNTER — PATIENT MESSAGE (OUTPATIENT)
Dept: ADMINISTRATIVE | Facility: HOSPITAL | Age: 21
End: 2021-03-25

## 2021-04-07 ENCOUNTER — OFFICE VISIT (OUTPATIENT)
Dept: URGENT CARE | Facility: CLINIC | Age: 21
End: 2021-04-07
Payer: OTHER GOVERNMENT

## 2021-04-07 VITALS
SYSTOLIC BLOOD PRESSURE: 137 MMHG | BODY MASS INDEX: 44.41 KG/M2 | WEIGHT: 293 LBS | OXYGEN SATURATION: 99 % | HEART RATE: 78 BPM | DIASTOLIC BLOOD PRESSURE: 72 MMHG | HEIGHT: 68 IN | TEMPERATURE: 99 F

## 2021-04-07 DIAGNOSIS — Z20.822 CLOSE EXPOSURE TO COVID-19 VIRUS: ICD-10-CM

## 2021-04-07 DIAGNOSIS — R05.9 COUGH: Primary | ICD-10-CM

## 2021-04-07 LAB
CTP QC/QA: YES
SARS-COV-2 RDRP RESP QL NAA+PROBE: NEGATIVE

## 2021-04-07 PROCEDURE — U0002 COVID-19 LAB TEST NON-CDC: HCPCS | Mod: QW,S$GLB,, | Performed by: PHYSICIAN ASSISTANT

## 2021-04-07 PROCEDURE — 99213 PR OFFICE/OUTPT VISIT, EST, LEVL III, 20-29 MIN: ICD-10-PCS | Mod: S$GLB,,, | Performed by: PHYSICIAN ASSISTANT

## 2021-04-07 PROCEDURE — 99213 OFFICE O/P EST LOW 20 MIN: CPT | Mod: S$GLB,,, | Performed by: PHYSICIAN ASSISTANT

## 2021-04-07 PROCEDURE — U0002: ICD-10-PCS | Mod: QW,S$GLB,, | Performed by: PHYSICIAN ASSISTANT

## 2021-04-10 ENCOUNTER — TELEPHONE (OUTPATIENT)
Dept: URGENT CARE | Facility: CLINIC | Age: 21
End: 2021-04-10

## 2021-04-12 ENCOUNTER — OFFICE VISIT (OUTPATIENT)
Dept: INTERNAL MEDICINE | Facility: CLINIC | Age: 21
End: 2021-04-12
Payer: OTHER GOVERNMENT

## 2021-04-12 ENCOUNTER — HOSPITAL ENCOUNTER (OUTPATIENT)
Dept: RADIOLOGY | Facility: HOSPITAL | Age: 21
Discharge: HOME | End: 2021-04-12
Attending: INTERNAL MEDICINE
Payer: OTHER GOVERNMENT

## 2021-04-12 VITALS
HEIGHT: 68 IN | RESPIRATION RATE: 18 BRPM | SYSTOLIC BLOOD PRESSURE: 128 MMHG | BODY MASS INDEX: 44.41 KG/M2 | DIASTOLIC BLOOD PRESSURE: 84 MMHG | HEART RATE: 109 BPM | WEIGHT: 293 LBS

## 2021-04-12 DIAGNOSIS — R41.840 ATTENTION DEFICIT: ICD-10-CM

## 2021-04-12 DIAGNOSIS — F98.8 ATTENTION DEFICIT DISORDER (ADD) WITHOUT HYPERACTIVITY: Chronic | ICD-10-CM

## 2021-04-12 DIAGNOSIS — Z00.00 ROUTINE GENERAL MEDICAL EXAMINATION AT A HEALTH CARE FACILITY: Primary | ICD-10-CM

## 2021-04-12 DIAGNOSIS — M54.9 DORSALGIA, UNSPECIFIED: ICD-10-CM

## 2021-04-12 DIAGNOSIS — G89.29 CHRONIC BILATERAL LOW BACK PAIN WITH RIGHT-SIDED SCIATICA: ICD-10-CM

## 2021-04-12 DIAGNOSIS — M54.41 CHRONIC BILATERAL LOW BACK PAIN WITH RIGHT-SIDED SCIATICA: ICD-10-CM

## 2021-04-12 PROCEDURE — 72100 XR LUMBAR SPINE AP AND LATERAL: ICD-10-PCS | Mod: 26,,, | Performed by: RADIOLOGY

## 2021-04-12 PROCEDURE — 99999 PR PBB SHADOW E&M-EST. PATIENT-LVL V: ICD-10-PCS | Mod: PBBFAC,,, | Performed by: INTERNAL MEDICINE

## 2021-04-12 PROCEDURE — 99215 OFFICE O/P EST HI 40 MIN: CPT | Mod: PBBFAC,25 | Performed by: INTERNAL MEDICINE

## 2021-04-12 PROCEDURE — 72100 X-RAY EXAM L-S SPINE 2/3 VWS: CPT | Mod: 26,,, | Performed by: RADIOLOGY

## 2021-04-12 PROCEDURE — 99395 PREV VISIT EST AGE 18-39: CPT | Mod: 25,S$PBB,, | Performed by: INTERNAL MEDICINE

## 2021-04-12 PROCEDURE — 72100 X-RAY EXAM L-S SPINE 2/3 VWS: CPT | Mod: TC

## 2021-04-12 PROCEDURE — 99999 PR PBB SHADOW E&M-EST. PATIENT-LVL V: CPT | Mod: PBBFAC,,, | Performed by: INTERNAL MEDICINE

## 2021-04-12 PROCEDURE — 99214 PR OFFICE/OUTPT VISIT, EST, LEVL IV, 30-39 MIN: ICD-10-PCS | Mod: 25,S$PBB,, | Performed by: INTERNAL MEDICINE

## 2021-04-12 PROCEDURE — 99395 PR PREVENTIVE VISIT,EST,18-39: ICD-10-PCS | Mod: 25,S$PBB,, | Performed by: INTERNAL MEDICINE

## 2021-04-12 PROCEDURE — 99214 OFFICE O/P EST MOD 30 MIN: CPT | Mod: 25,S$PBB,, | Performed by: INTERNAL MEDICINE

## 2021-04-12 RX ORDER — MELOXICAM 15 MG/1
15 TABLET ORAL DAILY
Qty: 7 TABLET | Refills: 0 | Status: SHIPPED | OUTPATIENT
Start: 2021-04-12

## 2021-04-12 RX ORDER — LISDEXAMFETAMINE DIMESYLATE 30 MG/1
30 CAPSULE ORAL EVERY MORNING
Qty: 30 CAPSULE | Refills: 0 | Status: SHIPPED | OUTPATIENT
Start: 2021-04-12

## 2021-04-13 ENCOUNTER — LAB VISIT (OUTPATIENT)
Dept: LAB | Facility: HOSPITAL | Age: 21
End: 2021-04-13
Attending: INTERNAL MEDICINE
Payer: OTHER GOVERNMENT

## 2021-04-13 DIAGNOSIS — Z00.00 ROUTINE GENERAL MEDICAL EXAMINATION AT A HEALTH CARE FACILITY: ICD-10-CM

## 2021-04-13 LAB
ALBUMIN SERPL BCP-MCNC: 3.3 G/DL (ref 3.5–5.2)
ALP SERPL-CCNC: 60 U/L (ref 55–135)
ALT SERPL W/O P-5'-P-CCNC: 16 U/L (ref 10–44)
ANION GAP SERPL CALC-SCNC: 9 MMOL/L (ref 8–16)
AST SERPL-CCNC: 11 U/L (ref 10–40)
BASOPHILS # BLD AUTO: 0.04 K/UL (ref 0–0.2)
BASOPHILS NFR BLD: 0.5 % (ref 0–1.9)
BILIRUB SERPL-MCNC: 0.3 MG/DL (ref 0.1–1)
BUN SERPL-MCNC: 12 MG/DL (ref 6–20)
CALCIUM SERPL-MCNC: 9.1 MG/DL (ref 8.7–10.5)
CHLORIDE SERPL-SCNC: 107 MMOL/L (ref 95–110)
CHOLEST SERPL-MCNC: 168 MG/DL (ref 120–199)
CHOLEST/HDLC SERPL: 4.1 {RATIO} (ref 2–5)
CO2 SERPL-SCNC: 24 MMOL/L (ref 23–29)
CREAT SERPL-MCNC: 0.8 MG/DL (ref 0.5–1.4)
DIFFERENTIAL METHOD: NORMAL
EOSINOPHIL # BLD AUTO: 0.2 K/UL (ref 0–0.5)
EOSINOPHIL NFR BLD: 2.1 % (ref 0–8)
ERYTHROCYTE [DISTWIDTH] IN BLOOD BY AUTOMATED COUNT: 12.2 % (ref 11.5–14.5)
EST. GFR  (AFRICAN AMERICAN): >60 ML/MIN/1.73 M^2
EST. GFR  (NON AFRICAN AMERICAN): >60 ML/MIN/1.73 M^2
GLUCOSE SERPL-MCNC: 94 MG/DL (ref 70–110)
HCT VFR BLD AUTO: 41 % (ref 37–48.5)
HDLC SERPL-MCNC: 41 MG/DL (ref 40–75)
HDLC SERPL: 24.4 % (ref 20–50)
HGB BLD-MCNC: 13.5 G/DL (ref 12–16)
IMM GRANULOCYTES # BLD AUTO: 0.03 K/UL (ref 0–0.04)
IMM GRANULOCYTES NFR BLD AUTO: 0.4 % (ref 0–0.5)
LDLC SERPL CALC-MCNC: 111.4 MG/DL (ref 63–159)
LYMPHOCYTES # BLD AUTO: 3.4 K/UL (ref 1–4.8)
LYMPHOCYTES NFR BLD: 42 % (ref 18–48)
MCH RBC QN AUTO: 29.3 PG (ref 27–31)
MCHC RBC AUTO-ENTMCNC: 32.9 G/DL (ref 32–36)
MCV RBC AUTO: 89 FL (ref 82–98)
MONOCYTES # BLD AUTO: 0.5 K/UL (ref 0.3–1)
MONOCYTES NFR BLD: 6 % (ref 4–15)
NEUTROPHILS # BLD AUTO: 3.9 K/UL (ref 1.8–7.7)
NEUTROPHILS NFR BLD: 49 % (ref 38–73)
NONHDLC SERPL-MCNC: 127 MG/DL
NRBC BLD-RTO: 0 /100 WBC
PLATELET # BLD AUTO: 295 K/UL (ref 150–450)
PMV BLD AUTO: 9.4 FL (ref 9.2–12.9)
POTASSIUM SERPL-SCNC: 4.2 MMOL/L (ref 3.5–5.1)
PROT SERPL-MCNC: 6.8 G/DL (ref 6–8.4)
RBC # BLD AUTO: 4.61 M/UL (ref 4–5.4)
SODIUM SERPL-SCNC: 140 MMOL/L (ref 136–145)
TRIGL SERPL-MCNC: 78 MG/DL (ref 30–150)
TSH SERPL DL<=0.005 MIU/L-ACNC: 2.02 UIU/ML (ref 0.4–4)
WBC # BLD AUTO: 8.04 K/UL (ref 3.9–12.7)

## 2021-04-13 PROCEDURE — 86803 HEPATITIS C AB TEST: CPT | Performed by: INTERNAL MEDICINE

## 2021-04-13 PROCEDURE — 84443 ASSAY THYROID STIM HORMONE: CPT | Performed by: INTERNAL MEDICINE

## 2021-04-13 PROCEDURE — 80061 LIPID PANEL: CPT | Performed by: INTERNAL MEDICINE

## 2021-04-13 PROCEDURE — 80053 COMPREHEN METABOLIC PANEL: CPT | Performed by: INTERNAL MEDICINE

## 2021-04-13 PROCEDURE — 36415 COLL VENOUS BLD VENIPUNCTURE: CPT | Performed by: INTERNAL MEDICINE

## 2021-04-13 PROCEDURE — 86703 HIV-1/HIV-2 1 RESULT ANTBDY: CPT | Performed by: INTERNAL MEDICINE

## 2021-04-13 PROCEDURE — 85025 COMPLETE CBC W/AUTO DIFF WBC: CPT | Performed by: INTERNAL MEDICINE

## 2021-04-15 LAB
HCV AB SERPL QL IA: NEGATIVE
HIV 1+2 AB+HIV1 P24 AG SERPL QL IA: NEGATIVE

## 2021-04-20 ENCOUNTER — TELEPHONE (OUTPATIENT)
Dept: INTERNAL MEDICINE | Facility: CLINIC | Age: 21
End: 2021-04-20

## 2021-04-23 ENCOUNTER — CLINICAL SUPPORT (OUTPATIENT)
Dept: REHABILITATION | Facility: HOSPITAL | Age: 21
End: 2021-04-23
Attending: INTERNAL MEDICINE
Payer: OTHER GOVERNMENT

## 2021-04-23 DIAGNOSIS — G89.29 CHRONIC BILATERAL LOW BACK PAIN WITH RIGHT-SIDED SCIATICA: ICD-10-CM

## 2021-04-23 DIAGNOSIS — M54.41 CHRONIC BILATERAL LOW BACK PAIN WITH RIGHT-SIDED SCIATICA: ICD-10-CM

## 2021-04-23 DIAGNOSIS — R29.898 RIGHT LEG WEAKNESS: ICD-10-CM

## 2021-04-23 PROCEDURE — 97161 PT EVAL LOW COMPLEX 20 MIN: CPT

## 2021-04-27 ENCOUNTER — CLINICAL SUPPORT (OUTPATIENT)
Dept: REHABILITATION | Facility: HOSPITAL | Age: 21
End: 2021-04-27
Payer: OTHER GOVERNMENT

## 2021-04-27 DIAGNOSIS — G89.29 CHRONIC BILATERAL LOW BACK PAIN WITH RIGHT-SIDED SCIATICA: Primary | ICD-10-CM

## 2021-04-27 DIAGNOSIS — M54.41 CHRONIC BILATERAL LOW BACK PAIN WITH RIGHT-SIDED SCIATICA: Primary | ICD-10-CM

## 2021-04-27 DIAGNOSIS — R29.898 RIGHT LEG WEAKNESS: ICD-10-CM

## 2021-04-27 PROCEDURE — 97110 THERAPEUTIC EXERCISES: CPT

## 2021-05-06 ENCOUNTER — CLINICAL SUPPORT (OUTPATIENT)
Dept: REHABILITATION | Facility: HOSPITAL | Age: 21
End: 2021-05-06
Payer: OTHER GOVERNMENT

## 2021-05-06 DIAGNOSIS — G89.29 CHRONIC BILATERAL LOW BACK PAIN WITH RIGHT-SIDED SCIATICA: Primary | ICD-10-CM

## 2021-05-06 DIAGNOSIS — R29.898 RIGHT LEG WEAKNESS: ICD-10-CM

## 2021-05-06 DIAGNOSIS — M54.41 CHRONIC BILATERAL LOW BACK PAIN WITH RIGHT-SIDED SCIATICA: Primary | ICD-10-CM

## 2021-05-06 PROCEDURE — 97014 ELECTRIC STIMULATION THERAPY: CPT

## 2021-05-06 PROCEDURE — 97110 THERAPEUTIC EXERCISES: CPT

## 2021-05-06 NOTE — LETTER
February 17, 2020      Facundo Quiñones MD  [address]  [place] [address]           [facility]  [address] [place] [address]  Phone: [phone]  Fax: [phone]          Patient: Hazel Villagran   MR Number: [id number]   YOB: 2000   Date of Visit: 2/17/2020       Dear Dr. Facundo Quiñones:    Thank you for referring Hazel Villagran to me for evaluation. Attached you will find relevant portions of my assessment and plan of care.    If you have questions, please do not hesitate to call me. I look forward to following Hazel Villagran along with you.    Sincerely,    Jeri Cardoso, NP    Enclosure  CC:  No Recipients    If you would like to receive this communication electronically, please contact [email or web address] or [phone] to request more information on [employer] access.    For providers and/or their staff who would like to refer a patient to [facility], please contact us through our one-stop-shop provider referral line, [facility], at [phone].    If you feel you have received this communication in error or would no longer like to receive these types of communications, please e-mail [email or web address]         
Statement Selected

## 2021-07-13 ENCOUNTER — TELEPHONE (OUTPATIENT)
Dept: INTERNAL MEDICINE | Facility: CLINIC | Age: 21
End: 2021-07-13

## 2021-07-28 ENCOUNTER — CLINICAL SUPPORT (OUTPATIENT)
Dept: URGENT CARE | Facility: CLINIC | Age: 21
End: 2021-07-28
Payer: OTHER GOVERNMENT

## 2021-07-28 VITALS
TEMPERATURE: 99 F | DIASTOLIC BLOOD PRESSURE: 87 MMHG | RESPIRATION RATE: 18 BRPM | SYSTOLIC BLOOD PRESSURE: 157 MMHG | HEART RATE: 76 BPM | OXYGEN SATURATION: 100 %

## 2021-07-28 DIAGNOSIS — Z11.52 ENCOUNTER FOR SCREENING FOR COVID-19: Primary | ICD-10-CM

## 2021-07-28 LAB
CTP QC/QA: YES
SARS-COV-2 RDRP RESP QL NAA+PROBE: NEGATIVE

## 2021-07-28 PROCEDURE — 99211 PR OFFICE/OUTPT VISIT, EST, LEVL I: ICD-10-PCS | Mod: S$GLB,CS,, | Performed by: PHYSICIAN ASSISTANT

## 2021-07-28 PROCEDURE — U0002: ICD-10-PCS | Mod: QW,S$GLB,, | Performed by: PHYSICIAN ASSISTANT

## 2021-07-28 PROCEDURE — U0002 COVID-19 LAB TEST NON-CDC: HCPCS | Mod: QW,S$GLB,, | Performed by: PHYSICIAN ASSISTANT

## 2021-07-28 PROCEDURE — 99211 OFF/OP EST MAY X REQ PHY/QHP: CPT | Mod: S$GLB,CS,, | Performed by: PHYSICIAN ASSISTANT

## 2021-07-31 NOTE — TELEPHONE ENCOUNTER
----- Message from Peggy Sanchez sent at 1/3/2019 12:04 PM CST -----  Contact: mother(Jazzmine)-[phone]  Would like to consult with nurse regarding medication refill(Jake-ADHD),please call back at [phone]. Thanks/ar  
Spoke with pt's mother, scheduled appt for est care with . Mother verbalized understanding.   
Patient requests all Lab, Cardiology, and Radiology Results on their Discharge Instructions

## 2021-12-06 ENCOUNTER — OFFICE VISIT (OUTPATIENT)
Dept: URGENT CARE | Facility: CLINIC | Age: 21
End: 2021-12-06
Payer: OTHER GOVERNMENT

## 2021-12-06 VITALS
HEART RATE: 98 BPM | SYSTOLIC BLOOD PRESSURE: 145 MMHG | BODY MASS INDEX: 44.41 KG/M2 | OXYGEN SATURATION: 99 % | WEIGHT: 293 LBS | DIASTOLIC BLOOD PRESSURE: 75 MMHG | HEIGHT: 68 IN | RESPIRATION RATE: 18 BRPM

## 2021-12-06 DIAGNOSIS — Z20.822 COVID-19 RULED OUT: ICD-10-CM

## 2021-12-06 DIAGNOSIS — J06.9 UPPER RESPIRATORY TRACT INFECTION, UNSPECIFIED TYPE: ICD-10-CM

## 2021-12-06 DIAGNOSIS — Z20.822 CLOSE EXPOSURE TO COVID-19 VIRUS: Primary | ICD-10-CM

## 2021-12-06 LAB
CTP QC/QA: YES
SARS-COV-2 RDRP RESP QL NAA+PROBE: NEGATIVE

## 2021-12-06 PROCEDURE — U0002 COVID-19 LAB TEST NON-CDC: HCPCS | Mod: QW,S$GLB,, | Performed by: PHYSICIAN ASSISTANT

## 2021-12-06 PROCEDURE — 99213 OFFICE O/P EST LOW 20 MIN: CPT | Mod: S$GLB,,, | Performed by: PHYSICIAN ASSISTANT

## 2021-12-06 PROCEDURE — 99213 PR OFFICE/OUTPT VISIT, EST, LEVL III, 20-29 MIN: ICD-10-PCS | Mod: S$GLB,,, | Performed by: PHYSICIAN ASSISTANT

## 2021-12-06 PROCEDURE — U0002: ICD-10-PCS | Mod: QW,S$GLB,, | Performed by: PHYSICIAN ASSISTANT

## 2021-12-09 ENCOUNTER — TELEPHONE (OUTPATIENT)
Dept: URGENT CARE | Facility: CLINIC | Age: 21
End: 2021-12-09
Payer: OTHER GOVERNMENT

## 2022-03-04 ENCOUNTER — PATIENT OUTREACH (OUTPATIENT)
Dept: ADMINISTRATIVE | Facility: HOSPITAL | Age: 22
End: 2022-03-04

## 2022-03-04 NOTE — PROGRESS NOTES
BR PAP REPORT: Chart was reviewed for overdue pap smear. Called pt to schedule no answer unable to leave a message VM is full.

## 2022-04-27 ENCOUNTER — PATIENT MESSAGE (OUTPATIENT)
Dept: ADMINISTRATIVE | Facility: HOSPITAL | Age: 22
End: 2022-04-27

## 2022-06-27 NOTE — TELEPHONE ENCOUNTER
----- Message from Calderon Weaver sent at 5/21/2019 12:05 PM CDT -----  Contact: [phone]  ..Type:  RX Refill Request    Who Called: Hazel   Refill or New Rx:refill  RX Name and Strength:Vvyanse 25 or 30 mg   How is the patient currently taking it? (ex. 1XDay):daily  Is this a 30 day or 90 day RX:30  Preferred Pharmacy with phone number:..  [facility] [place] - [address]  [address]  [place] [address]  Phone: [phone] Fax: [phone]    Local or Mail Order:local   Ordering Provider:   Would the patient rather a call back or a response via [facility]? Call back   Best Call Back Number:[phone]  Additional Information:      [facility] ambulatory encounter  URGENT CARE OFFICE VISIT    SUBJECTIVE:  Miri Santiago is a 31 year old female who presented requesting evaluation for , patient comes in mainly concerned about persistent frustrating vaginal irritation and discharge that she has had for years they do flare up frequently temporarily get relieved after treatment with courses of antibiotic clindamycin or metronidazole currently taking metronidazole 200 mg for the last month.  She finished a course of clindamycin 2 weeks ago symptoms partially.  Patient continued to experience irritation burning sensation itching and scant amount of clear vaginal discharge.  She has not been sexually active for 1 month her menstrual cycles are regular and currently taking oral contraceptives.  She has no intermenstrual spotting or bleeding no excessive menstrual cycles.  Denies symptoms suggestive of dyspareunia.  Sexually active in monogamous relationship had history of chlamydia and gonorrhea once in the past.  Did not notice any vaginal itching or lesions or blisters.  Patient took 1 dose of Diflucan and 2 doses of Monistat 4 days ago symptoms are not improved but they are not worse.  She  She is taking the acyclovir as needed for oral cold sores.  Patient denies fever denies chills.  She has no acute pelvic pain.  Second complaint and concern patient is reporting back she has been having abdominal discomfort and pain on off for 6 years describes it as feeling bloated burning sensation from the epigastric area down to her lower abdomen.  Pain has no relation to food or activity or certain meals.  Her bowel habits have been normal.  No prior history of gallbladder attacks no prior history of colitis or irritable bowel syndrome.  She is concerned if she has gallbladder issues.  She had history of GERD and she is taking omeprazole and that is controlling her symptoms.  This abdominal pain is not any worse or different from or chronic issues for the last  6 years she denies excessive NSAID use or aspirin.  No alcohol drinking.  No history of peptic ulcer disease.  She has no bloody or tarry stools.  She stated the pain get worse when she is stressed out.  Recently had evaluation by her primary care provider and had a blood workup that she does not know the results.    Review of systems:    A review of systems was performed and findings relevant to these symptoms are included in the HPI.      PAST HISTORIES:    ALLERGIES:   Allergen Reactions   • Nuts   (Food) ANAPHYLAXIS   • Amoxicillin PRURITUS       MEDICATIONS:  Current Outpatient Medications   Medication Sig   • metroNIDAZOLE (FLAGYL) 250 MG tablet Take 1 tablet by mouth daily.   • ergocalciferol (DRISDOL) 1.25 mg (50,000 units) capsule Take 1 capsule by mouth 1 day a week. Indications: Vitamin D Deficiency   • varenicline (CHANTIX) 1 MG tablet TAKE 1/2 EVERY MORNING DAYS 1-3, 1/2 EVERY MORNING AND 1/2 EVERY EVENING DAYS 4-7, 1 EVERY MORNING AND 1 EVERY EVENING DAYS 8-28   • omeprazole (PriLOSEC) 40 MG capsule Take 1 capsule by mouth daily.   • norethindrone-ethinyl estradiol (Junel 1/20) 1-20 MG-MCG per tablet Take 1 tablet by mouth daily. Skip all placebo pills.   • valACYclovir (VALTREX) 500 MG tablet Take 1 tablet by mouth daily.   • albuterol 108 (90 Base) MCG/ACT inhaler Inhale 2 puffs into the lungs every 4 hours as needed for Shortness of Breath or Wheezing.   • SUMAtriptan (IMITREX) 50 MG tablet Take 50 mg by mouth as needed.   • betamethasone dipropionate augmented (DIPROLENE AF) 0.05 % cream Apply twice a day to affected area on lower extremity as needed.   • hydroCORTisone (CORTIZONE) 2.5 % cream Apply twice a day to eczema areas on the neck and face and needed.   • clobetasol (TEMOVATE) 0.05 % topical solution Apply topically twice a day to scaly areas on the scalp as needed.   • clindamycin (CLEOCIN T) 1 % lotion Apply twice a day to affected area on the thigh as needed.     No current  facility-administered medications for this visit.       Past Medical History:   Diagnosis Date   • Asthma    • Attention deficit disorder    • Depression    • Food allergy     Peanut     • GERD (gastroesophageal reflux disease)    • Hidradenitis    • Seasonal allergies    • Vocal cord dysfunction        OBJECTIVE:  Physical Exam:    Visit Vitals  /80 (Cuff Size: Regular)   Pulse 73   Temp 98.5 °F (36.9 °C) (Oral)   Resp 16   Ht 5' 7.5\" (1.715 m)   Wt 79.7 kg (175 lb 12.8 oz)   LMP 06/04/2022 (Exact Date)   SpO2 98%   BMI 27.13 kg/m²        CONSTITUTIONAL: Well-hydrated, well-nourished female who appears to be in no acute distress. Awake, alert and cooperative.  No apparent pallor or diaphoresis or jaundice  HEENT: TMs are clear bilaterally. External ears without deformities. Hearing is grossly normal. Nose without deformities. There is moist nasal mucosa. Throat is clear with moist mucous membranes.   NECK: No lymphadenopathy (check) or thyroid enlargement. There is full range of motion. There is no tenderness noted.  LUNGS: Clear to auscultation bilaterally. No wheezes, rales or rhonchi noted.   CARDIOVASCULAR: Regular rate and rhythm with normal S1 and S2. There are no murmurs auscultated.   ABDOMEN:  Not distended, soft and non-tender.  No guarding no rebound tenderness no palpable fullness no CVA tenderness No masses. No liver or spleen enlargement. Bowel sounds normal.   SKIN: Warm, dry, intact without rash or lesion.  NEUROLOGIC: Alert and oriented x3.  PSYCHIATRIC:  Speech and behavior appropriate. Normal mood and affect.  :  There is no external genital lesions or rashes or warts or blisters.  No external redness or skin maceration.  Noticed is piercing in the clitoral region without any signs of bruising or inflammation or infection.  Introitus and vaginal wall mucosa the are normal pink color no erythema.  There is as scant amount of clear to whitish color discharge.  No grossly visible lesion on  the cervix, no cervical motion  tenderness no adnexal masses or tenderness, uterus measures normal-size  mid position and nontender     DIAGNOSTICS:  Wet mount:  Yeast not detected Trichomonas not detected, clue cells present  GC and chlamydia screen:  Pending  Reviewed recent labs from June 7th 2022 , CBC CMP vitamin B12 TSH they were all within normal range      Assessment:  Chronic vaginal symptoms of irritation and discharge.  History of chronic and recurrent bacterial vaginosis.  Chronic nonspecific abdominal pain for 6 years etiology is unclear, no signs of acute abdomen at the present time    PLAN:  Treat with metronidazole 500 mg tablet 1 p.o. b.i.d. for 1 week  FOLLOW-UP:  With primary care physician in 1-2 weeks  PATIENT INSTRUCTIONS:   Over-the-counter oral probiotics for 2 weeks.    Follow-up your OB/GYN provider in 1 week.  For your chronic abdominal pain and discomfort I recommend that you avoid alcohol and caffeine avoid deep fried meals and fast food.  If your abdominal pain becomes severe with vomiting you go to the emergency room.  The patient was advised to follow up with primary physician or to recheck with the urgent care clinic sooner if symptoms get worse or if new symptoms appear.    The patient indicated understanding of the diagnosis and agreed with the plan of care.

## 2022-09-29 ENCOUNTER — PATIENT OUTREACH (OUTPATIENT)
Dept: ADMINISTRATIVE | Facility: HOSPITAL | Age: 22
End: 2022-09-29